# Patient Record
Sex: MALE | Race: OTHER | Employment: FULL TIME | URBAN - METROPOLITAN AREA
[De-identification: names, ages, dates, MRNs, and addresses within clinical notes are randomized per-mention and may not be internally consistent; named-entity substitution may affect disease eponyms.]

---

## 2019-05-04 ENCOUNTER — HOSPITAL ENCOUNTER (EMERGENCY)
Facility: HOSPITAL | Age: 34
Discharge: HOME | End: 2019-05-04
Attending: EMERGENCY MEDICINE
Payer: COMMERCIAL

## 2019-05-04 VITALS
RESPIRATION RATE: 18 BRPM | SYSTOLIC BLOOD PRESSURE: 116 MMHG | DIASTOLIC BLOOD PRESSURE: 62 MMHG | BODY MASS INDEX: 23.32 KG/M2 | TEMPERATURE: 100 F | OXYGEN SATURATION: 96 % | HEART RATE: 90 BPM | HEIGHT: 65 IN | WEIGHT: 140 LBS

## 2019-05-04 DIAGNOSIS — L03.119 CELLULITIS OF LOWER EXTREMITY, UNSPECIFIED LATERALITY: Primary | ICD-10-CM

## 2019-05-04 PROCEDURE — 85025 COMPLETE CBC W/AUTO DIFF WBC: CPT | Performed by: EMERGENCY MEDICINE

## 2019-05-04 PROCEDURE — 80048 BASIC METABOLIC PNL TOTAL CA: CPT | Performed by: EMERGENCY MEDICINE

## 2019-05-04 PROCEDURE — 36415 COLL VENOUS BLD VENIPUNCTURE: CPT

## 2019-05-04 PROCEDURE — 99283 EMERGENCY DEPT VISIT LOW MDM: CPT

## 2019-05-04 RX ORDER — ACETAMINOPHEN 500 MG
1000 TABLET ORAL ONCE
Status: COMPLETED | OUTPATIENT
Start: 2019-05-04 | End: 2019-05-04

## 2019-05-04 RX ORDER — SULFAMETHOXAZOLE AND TRIMETHOPRIM 800; 160 MG/1; MG/1
1 TABLET ORAL 2 TIMES DAILY
Qty: 20 TABLET | Refills: 0 | Status: SHIPPED | OUTPATIENT
Start: 2019-05-04 | End: 2019-05-14

## 2019-05-05 NOTE — ED PROVIDER NOTES
Patient Seen in: [facility] AND [facility] Emergency Department    History   Patient presents with:  Pain (neurologic)      HPI    Patient presents to the ED complaining of pain and redness to bilateral lower legs. symptoms started 3 days ago.   Started on [place] Amaury normal and breath sounds normal. No stridor. No respiratory distress. He has no wheezes. Abdominal: Soft. He exhibits no distension. Musculoskeletal: He exhibits no edema or deformity.    Neurological: He is alert and oriented to person, place, and time °F (38 °C) 99.6 °F (37.6 °C)   TempSrc: Temporal Oral Temporal   SpO2: 96%  96%   Weight: 63.5 kg     Height: 165.1 cm (5' 5\")       *I personally reviewed and interpreted all ED vitals.     Pulse Ox: 96%, Room air, Normal     Differential Diagnosis/ Diagn

## 2019-05-05 NOTE — ED NOTES
Pt presents with bilateral pain, erythema to BLE. Pt states he was started on medications this week by his PMD. Pt continues to have pain, fevers. Pt not taking tylenol or motrin. Pt has multiple purple circular areas on the legs that are hot to touch.

## 2019-05-05 NOTE — ED INITIAL ASSESSMENT (HPI)
Pt was seen by PMD for bilateral lower extremity infection started on cephalexin and mupirocin cream last Wednesday. Per pt he has taken all the doses but pain has increased. + fevers.

## (undated) NOTE — LETTER
Date & Time: 5/4/2019, 10:12 PM  Patient: Ambreen Toth  Encounter Provider(s):    Soheila Petersen MD       To Whom It May Concern:    Ambreen Toth was seen and treated in our department on 5/4/2019.  He should not return to work unt

## (undated) NOTE — ED AVS SNAPSHOT
Nery March   MRN: [id number]    Department:  [facility] Emergency Department   Date of Visit:  5/4/2019           Disclosure     Insurance plans vary and the physician(s) referred by the ER may not be covered by your plan.  Please co CARE PHYSICIAN AT ONCE OR RETURN IMMEDIATELY TO THE EMERGENCY DEPARTMENT. If you have been prescribed any medication(s), please fill your prescription right away and begin taking the medication(s) as directed.   If you believe that any of the medications